# Patient Record
Sex: FEMALE | Race: WHITE | NOT HISPANIC OR LATINO | URBAN - METROPOLITAN AREA
[De-identification: names, ages, dates, MRNs, and addresses within clinical notes are randomized per-mention and may not be internally consistent; named-entity substitution may affect disease eponyms.]

---
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
## 2019-11-22 NOTE — PROGRESS NOTE BEHAVIORAL HEALTH - NSBHCHARTREVIEWVS_PSY_A_CORE FT
yes
serum ammonia down to 23
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
## 2021-04-21 NOTE — ED PROVIDER NOTE - GASTROINTESTINAL NEGATIVE STATEMENT, MLM
mild abdominal pain. no nausea and no vomiting. no diarrhea or constipation no mild abdominal pain. no nausea and no vomiting

## 2021-09-13 NOTE — ED PROVIDER NOTE - DATE/TIME 5
Approve/deny alprazolam in place of dispersible?
From: Kimberley Brandt  To: Rylee Ram DO  Sent: 9/12/2021 6:38 PM CDT  Subject: Alprazolam ODT 0.5 MG Tab    Dr. Lakshmi Alcocer,    It seems impossible to fill the oral disintegrating tablets. The pharmacy is always out of stock.  Is it possible to get the regu
13-Jul-2018 17:21

## 2024-05-27 NOTE — ED ADULT NURSE NOTE - PYSCHOSOCIAL ASSESSMENT
Encounter addended by: Girish Asher RN on: 5/27/2024 2:09 AM   Actions taken: Care Teams modified - - -.

## 2025-07-24 NOTE — BEHAVIORAL HEALTH ASSESSMENT NOTE - PRIOR MEDICATION SIDE EFFECTS OR ADVERSE REACTIONS
Elisabeth Schoen    1974    [id number]    7/24/2025    [phone] (Mobile)      Caller:  Chris    Referral  Z91.89 (ICD-10-CM) - At high risk for breast cancer     Imaging performed and where:    7/23/25  MAMMO SCREENING BILATERAL [facility]    Additional Comments:     Provider:  Hilda Perez MD             
LVM to schedule new [facility] appointment, call back number and office hours provided.   
New patient appointment, schedule with Lizette Griffith  Diagnosis: high risk  30 minute nurse visit, 60 minute provider visit  Please mail patient welcome letter and 3 pager.  Thank you!    Ingrid     Screening bilateral mammogram performed 2025, BIRADS 1      Risk Explanation Rod 8 as of 2025    Risk Explanation Rod 8  Risk factors Patient Population   Breast cancer  (CMD) 29.58%    For people with patient's age and gender: 10.55 %  Including above and patient's race and ethnicity: 10.55 %  Including above and patient's hormonal and reproductive risk factors: 10.69 %  Including above and patient's medical history: 11.83 %  Including above and patient's family history: 23.71 %  Including above and patient's personal family genetic testin.71 %  Including above and patient's breast density: 29.58 %  Final score: 29.58 %     Last calculated by Fátima Oswald on 2025 at  2:10 PM     
Patient called and scheduled high risk appointment.  She had questions about her high risk.  I assured her that her mammogram from yesterday was normal and we have no concerns.  Based on the questions we asked her about her family history of cancer and some questions about herself, we calculated her lifetime risk of breast cancer at 30%.  The high risk clinic watches over our patients that have a greater than 20% chance of developing cancer in their lifetime.  We use screening tools such as mammograms and breast MRI's, clinical breast exams, and a few other options that we will talk to her about at her high risk appointment.  Genevieve verbalized understanding.  I assured Genevieve again this is for preventative measures, not that we found anything wrong on her most recent mammogram.  Genevieve verbalized reassurance.  
Patient calling back to schedule new [facility] appointment. Welcome letter and intake paperwork mailed.   
None known